# Patient Record
Sex: FEMALE | Race: WHITE | NOT HISPANIC OR LATINO | Employment: OTHER | URBAN - METROPOLITAN AREA
[De-identification: names, ages, dates, MRNs, and addresses within clinical notes are randomized per-mention and may not be internally consistent; named-entity substitution may affect disease eponyms.]

---

## 2017-02-13 ENCOUNTER — ALLSCRIPTS OFFICE VISIT (OUTPATIENT)
Dept: OTHER | Facility: OTHER | Age: 72
End: 2017-02-13

## 2017-05-22 ENCOUNTER — ALLSCRIPTS OFFICE VISIT (OUTPATIENT)
Dept: OTHER | Facility: OTHER | Age: 72
End: 2017-05-22

## 2017-06-26 ENCOUNTER — APPOINTMENT (OUTPATIENT)
Dept: LAB | Facility: HOSPITAL | Age: 72
End: 2017-06-26
Payer: MEDICARE

## 2017-06-26 ENCOUNTER — LAB REQUISITION (OUTPATIENT)
Dept: LAB | Facility: HOSPITAL | Age: 72
End: 2017-06-26
Payer: MEDICARE

## 2017-06-26 ENCOUNTER — ALLSCRIPTS OFFICE VISIT (OUTPATIENT)
Dept: OTHER | Facility: OTHER | Age: 72
End: 2017-06-26

## 2017-06-26 DIAGNOSIS — N95.0 POSTMENOPAUSAL BLEEDING: ICD-10-CM

## 2017-06-26 DIAGNOSIS — N94.9 UNSPECIFIED SYMPTOM ASSOCIATED WITH FEMALE GENITAL ORGANS: Primary | ICD-10-CM

## 2017-06-26 LAB
BACTERIA UR QL AUTO: ABNORMAL /HPF
BILIRUB UR QL STRIP: NEGATIVE
CLARITY UR: ABNORMAL
COLOR UR: YELLOW
GLUCOSE UR STRIP-MCNC: NEGATIVE MG/DL
HGB UR QL STRIP.AUTO: ABNORMAL
KETONES UR STRIP-MCNC: NEGATIVE MG/DL
LEUKOCYTE ESTERASE UR QL STRIP: ABNORMAL
NITRITE UR QL STRIP: NEGATIVE
NON-SQ EPI CELLS URNS QL MICRO: ABNORMAL /HPF
PH UR STRIP.AUTO: 6.5 [PH] (ref 4.5–8)
PROT UR STRIP-MCNC: NEGATIVE MG/DL
RBC #/AREA URNS AUTO: ABNORMAL /HPF
SP GR UR STRIP.AUTO: 1 (ref 1–1.03)
UROBILINOGEN UR QL STRIP.AUTO: 0.2 E.U./DL
WBC #/AREA URNS AUTO: ABNORMAL /HPF

## 2017-06-26 PROCEDURE — 88112 CYTOPATH CELL ENHANCE TECH: CPT

## 2017-06-26 PROCEDURE — 81001 URINALYSIS AUTO W/SCOPE: CPT

## 2017-06-26 PROCEDURE — 87086 URINE CULTURE/COLONY COUNT: CPT

## 2017-06-26 PROCEDURE — 87147 CULTURE TYPE IMMUNOLOGIC: CPT

## 2017-06-28 LAB — BACTERIA UR CULT: NORMAL

## 2017-06-29 ENCOUNTER — GENERIC CONVERSION - ENCOUNTER (OUTPATIENT)
Dept: OTHER | Facility: OTHER | Age: 72
End: 2017-06-29

## 2017-07-27 ENCOUNTER — ALLSCRIPTS OFFICE VISIT (OUTPATIENT)
Dept: OTHER | Facility: OTHER | Age: 72
End: 2017-07-27

## 2017-07-27 DIAGNOSIS — I35.0 NONRHEUMATIC AORTIC VALVE STENOSIS: ICD-10-CM

## 2018-01-12 VITALS — DIASTOLIC BLOOD PRESSURE: 80 MMHG | WEIGHT: 123 LBS | SYSTOLIC BLOOD PRESSURE: 128 MMHG | BODY MASS INDEX: 23.24 KG/M2

## 2018-01-13 VITALS — DIASTOLIC BLOOD PRESSURE: 82 MMHG | WEIGHT: 125 LBS | BODY MASS INDEX: 23.62 KG/M2 | SYSTOLIC BLOOD PRESSURE: 128 MMHG

## 2018-01-14 VITALS — SYSTOLIC BLOOD PRESSURE: 132 MMHG | BODY MASS INDEX: 24.02 KG/M2 | WEIGHT: 123 LBS | DIASTOLIC BLOOD PRESSURE: 80 MMHG

## 2018-01-14 NOTE — MISCELLANEOUS
Message   Recorded as Task   Date: 06/29/2017 07:16 AM, Created By: Myke Stoll   Task Name: Go to Result   Assigned To: Mark Anthony Yang   Regarding Patient: Lynne Cody, Status: In Progress   Comment:    Jennifer Norris - 29 Jun 2017 7:16 AM     TASK CREATED  urine shows some bacteria - not sure if they are implicated in her bleeding or not  Would treat with amoxil 250mg po TID x 10 days and await her pelvic ultrasound   Ariana Lemos - 29 Jun 2017 8:02 AM     TASK IN PROGRESS   Ariana Lemos - 29 Jun 2017 8:03 AM     TASK EDITED   Ariana Lemos - 29 Jun 2017 8:33 AM     TASK IN PROGRESS   Ariana Lemos - 29 Jun 2017 8:45 AM     TASK EDITED  Spoke with pt - she does not want the Amoxil and cancelled the pelvic u/s - asked why she cancelled it, pt stated she didn't want to go  She will treat it with probiotics and home remedies  Pt couldn't be swayed to go for the u/s  Will advise W87 of this  Active Problems    1  Anxiety disorder (300 00) (F41 9)   2  Aortic stenosis (424 1) (I35 0)   3  Cystocele (618 01) (N81 10)   4  Encounter for screening mammogram for breast cancer (V76 12) (Z12 31)   5  Hypertension (401 9) (I10)   6  Muscle ache (729 1) (M79 1)   7  Nausea (787 02) (R11 0)   8  Other specified cardiac dysrhythmias (427 89) (I49 8)   9  Postmenopausal bleeding (627 1) (N95 0)   10  Vaginal burning (625 8) (N94 9)    Current Meds   1  ALPRAZolam 0 25 MG Oral Tablet (Xanax); TAKE 1 TABLET 3 TIMES DAILY AS   NEEDED; Therapy: 41BEU9208 to (Renew:15Pkj3187); Last Rx:29Jun2016 Ordered   2  Premarin 0 625 MG/GM Vaginal Cream; sig 1 gm intravaginally 3 times week; Therapy: 24Apr2013 to (Evaluate:19Apr2014)  Requested for: 24Apr2013; Last   Rx:24Apr2013 Ordered   3  Sertraline HCl - 50 MG Oral Tablet (Zoloft); TAKE 1 TABLET DAILY AS DIRECTED; Therapy: 52KJN3085 to (Evaluate:24Jun2017)  Requested for: 00LTQ8482; Last   Rx:29Jun2016 Ordered   4   Verapamil HCl  MG Oral Capsule Extended Release 24 Hour; CP24 PO X 30; Therapy: 65OYU3269 to (Evaluate:24Jun2017)  Requested for: 23VRQ1305; Last   Rx:29Jun2016 Ordered    Allergies    1   No Known Drug Allergies    Plan  Vaginal burning    · Amoxicillin 250 MG Oral Capsule; TAKE 1 CAPSULE 3 TIMES DAILY    Signatures   Electronically signed by : Issac Brown, ; Jun 29 2017  8:45AM EST                       (Author)

## 2018-01-15 VITALS
HEIGHT: 60 IN | DIASTOLIC BLOOD PRESSURE: 86 MMHG | RESPIRATION RATE: 16 BRPM | WEIGHT: 123.25 LBS | HEART RATE: 59 BPM | OXYGEN SATURATION: 98 % | TEMPERATURE: 99.1 F | SYSTOLIC BLOOD PRESSURE: 134 MMHG | BODY MASS INDEX: 24.2 KG/M2

## 2018-07-11 DIAGNOSIS — F41.9 ANXIETY: ICD-10-CM

## 2018-07-11 DIAGNOSIS — I10 ESSENTIAL HYPERTENSION: Primary | ICD-10-CM

## 2018-07-11 RX ORDER — VERAPAMIL HYDROCHLORIDE 300 MG/1
CAPSULE, EXTENDED RELEASE ORAL
COMMUNITY
Start: 2016-06-29 | End: 2018-07-11 | Stop reason: SDUPTHER

## 2018-07-11 RX ORDER — ALPRAZOLAM 0.25 MG/1
1 TABLET ORAL 3 TIMES DAILY PRN
COMMUNITY
Start: 2016-06-29 | End: 2018-07-11 | Stop reason: SDUPTHER

## 2018-07-12 RX ORDER — ALPRAZOLAM 0.25 MG/1
0.25 TABLET ORAL 3 TIMES DAILY PRN
Qty: 30 TABLET | Refills: 0 | Status: SHIPPED | OUTPATIENT
Start: 2018-07-12 | End: 2019-01-21 | Stop reason: SDUPTHER

## 2018-07-12 RX ORDER — VERAPAMIL HYDROCHLORIDE 300 MG/1
300 CAPSULE, EXTENDED RELEASE ORAL DAILY
Qty: 90 CAPSULE | Refills: 1 | Status: SHIPPED | OUTPATIENT
Start: 2018-07-12 | End: 2019-01-21 | Stop reason: SDUPTHER

## 2018-11-01 ENCOUNTER — TELEPHONE (OUTPATIENT)
Dept: FAMILY MEDICINE CLINIC | Facility: CLINIC | Age: 73
End: 2018-11-01

## 2018-11-08 ENCOUNTER — TELEPHONE (OUTPATIENT)
Dept: FAMILY MEDICINE CLINIC | Facility: CLINIC | Age: 73
End: 2018-11-08

## 2018-11-08 NOTE — TELEPHONE ENCOUNTER
----- Message from Ty Muniz LPN sent at 19/7/8433  3:28 PM EST -----  Regarding: FW: Appointment  Patient is also needs an AWV      ----- Message -----  From: Ty Muniz LPN  Sent: 71/09/9906  11:08 AM  To: [facility]  Subject: Appointment                                      Please call pt and schedule follow up with Dr Jeffrey Bishop   Has not been seen since 7/2017

## 2019-01-03 ENCOUNTER — OFFICE VISIT (OUTPATIENT)
Dept: OBGYN CLINIC | Facility: CLINIC | Age: 74
End: 2019-01-03
Payer: MEDICARE

## 2019-01-03 VITALS — SYSTOLIC BLOOD PRESSURE: 120 MMHG | DIASTOLIC BLOOD PRESSURE: 70 MMHG | BODY MASS INDEX: 22.26 KG/M2 | WEIGHT: 114 LBS

## 2019-01-03 DIAGNOSIS — N81.11 CYSTOCELE, MIDLINE: Primary | ICD-10-CM

## 2019-01-03 PROCEDURE — 99213 OFFICE O/P EST LOW 20 MIN: CPT | Performed by: PHYSICIAN ASSISTANT

## 2019-01-03 NOTE — PROGRESS NOTES
Cheryl Teressa  1945    S:  68 y o  female here for pessary insertion  She was seen last in 6/2017, at which time had had some brownish and then pink vaginal bleeding  Her pessary was removed and no erosions were seen  She was sent for a pelvic ultrasound but never went because she never had bleeding again  She now presents today to reinsert her pessary  She is not using her Premarin cream but would like to restart  She denies vaginal bleeding, discharge, itching, burning or odor  No past medical history on file  Social History     Social History    Marital status: /Civil Union     Spouse name: N/A    Number of children: N/A    Years of education: N/A     Social History Main Topics    Smoking status: Not on file    Smokeless tobacco: Not on file    Alcohol use Not on file    Drug use: Unknown    Sexual activity: Not on file     Other Topics Concern    Not on file     Social History Narrative    No narrative on file     No family history on file  O:  /70 (BP Location: Right arm, Patient Position: Sitting, Cuff Size: Large)   Wt 51 7 kg (114 lb)   LMP  (LMP Unknown)   BMI 22 26 kg/m²     She is A&O x 3  Abdomen is soft and nontender  External genitals are normal without rash or lesion  Vagina is normal without discharge or bleeding, no erosions seen    Pessary was reinserted without difficulty  A/P:  Pessary Care  Pt will return in 3 months for routine pessary maintenance  She will call sooner with any problems

## 2019-01-16 ENCOUNTER — OFFICE VISIT (OUTPATIENT)
Dept: OBGYN CLINIC | Facility: CLINIC | Age: 74
End: 2019-01-16
Payer: MEDICARE

## 2019-01-16 VITALS — BODY MASS INDEX: 22.46 KG/M2 | DIASTOLIC BLOOD PRESSURE: 78 MMHG | WEIGHT: 115 LBS | SYSTOLIC BLOOD PRESSURE: 128 MMHG

## 2019-01-16 DIAGNOSIS — N81.11 CYSTOCELE, MIDLINE: Primary | ICD-10-CM

## 2019-01-16 PROCEDURE — 99213 OFFICE O/P EST LOW 20 MIN: CPT | Performed by: PHYSICIAN ASSISTANT

## 2019-01-16 NOTE — PROGRESS NOTES
Melita Bella  1945    S:  68 y o  female here for pessary insertion  She was seen on 1/3/19 when her pessary was inserted; it had been out for over a year prior to that  She says it did very well for 3-4 days but then fell out while she was on the commode  She was unable to reinsert the pessary herself  She denies vaginal bleeding, discharge, itching, burning or odor  History reviewed  No pertinent past medical history  Social History     Social History    Marital status: /Civil Union     Spouse name: N/A    Number of children: N/A    Years of education: N/A     Social History Main Topics    Smoking status: Never Smoker    Smokeless tobacco: Never Used    Alcohol use No    Drug use: No    Sexual activity: Not Currently     Other Topics Concern    None     Social History Narrative    None     History reviewed  No pertinent family history  O:  /78 (BP Location: Right arm, Patient Position: Sitting, Cuff Size: Standard)   Wt 52 2 kg (115 lb)   LMP  (LMP Unknown)   BMI 22 46 kg/m²     She is A&O x 3  Abdomen is soft and nontender  External genitals are normal without rash or lesion  Vagina is normal without discharge or bleeding    Pessary was inserted without difficulty  It appears to be well fitted  A/P:  Pessary Care  Pt will return in 3 months for routine pessary maintenance  She will call sooner with any problems

## 2019-01-21 ENCOUNTER — OFFICE VISIT (OUTPATIENT)
Dept: FAMILY MEDICINE CLINIC | Facility: CLINIC | Age: 74
End: 2019-01-21
Payer: MEDICARE

## 2019-01-21 VITALS
HEIGHT: 59 IN | TEMPERATURE: 98.2 F | BODY MASS INDEX: 23.39 KG/M2 | DIASTOLIC BLOOD PRESSURE: 80 MMHG | WEIGHT: 116 LBS | OXYGEN SATURATION: 98 % | SYSTOLIC BLOOD PRESSURE: 110 MMHG | HEART RATE: 88 BPM | RESPIRATION RATE: 16 BRPM

## 2019-01-21 DIAGNOSIS — F41.9 ANXIETY: ICD-10-CM

## 2019-01-21 DIAGNOSIS — I10 ESSENTIAL HYPERTENSION: ICD-10-CM

## 2019-01-21 DIAGNOSIS — I35.0 AORTIC VALVE STENOSIS, ETIOLOGY OF CARDIAC VALVE DISEASE UNSPECIFIED: ICD-10-CM

## 2019-01-21 DIAGNOSIS — R53.83 FATIGUE, UNSPECIFIED TYPE: ICD-10-CM

## 2019-01-21 DIAGNOSIS — Z00.00 MEDICARE ANNUAL WELLNESS VISIT, SUBSEQUENT: Primary | ICD-10-CM

## 2019-01-21 DIAGNOSIS — E78.5 DYSLIPIDEMIA: ICD-10-CM

## 2019-01-21 PROCEDURE — 99214 OFFICE O/P EST MOD 30 MIN: CPT | Performed by: INTERNAL MEDICINE

## 2019-01-21 PROCEDURE — G0439 PPPS, SUBSEQ VISIT: HCPCS | Performed by: INTERNAL MEDICINE

## 2019-01-21 RX ORDER — ALPRAZOLAM 0.25 MG/1
0.25 TABLET ORAL 3 TIMES DAILY PRN
Qty: 90 TABLET | Refills: 0 | Status: SHIPPED | OUTPATIENT
Start: 2019-01-21 | End: 2019-07-09 | Stop reason: SDUPTHER

## 2019-01-21 RX ORDER — VERAPAMIL HYDROCHLORIDE 300 MG/1
300 CAPSULE, EXTENDED RELEASE ORAL DAILY
Qty: 90 CAPSULE | Refills: 1 | Status: SHIPPED | OUTPATIENT
Start: 2019-01-21 | End: 2019-07-09 | Stop reason: SDUPTHER

## 2019-01-21 NOTE — PROGRESS NOTES
Assessment and Plan:    Problem List Items Addressed This Visit     None        Health Maintenance Due   Topic Date Due    Hepatitis C Screening  1945    Depression Screening PHQ  1945    Medicare Annual Wellness Visit (AWV)  1945    MAMMOGRAM  1945    CRC Screening: Colonoscopy  1945    DTaP,Tdap,and Td Vaccines (1 - Tdap) 05/02/1966    Fall Risk  05/02/2010    Urinary Incontinence Screening  05/02/2010    Pneumococcal PPSV23/PCV13 65+ Years / Low and Medium Risk (1 of 2 - PCV13) 05/02/2010    INFLUENZA VACCINE  07/01/2018         HPI:  Bard Barclay is a 68 y o  female here for her Subsequent Wellness Visit  Patient Active Problem List   Diagnosis    Cystocele, midline     No past medical history on file  No past surgical history on file  No family history on file  History   Smoking Status    Never Smoker   Smokeless Tobacco    Never Used     History   Alcohol Use No      History   Drug Use No       Current Outpatient Prescriptions   Medication Sig Dispense Refill    ALPRAZolam (XANAX) 0 25 mg tablet Take 1 tablet (0 25 mg total) by mouth 3 (three) times a day as needed for anxiety 30 tablet 0    conjugated estrogens (PREMARIN) vaginal cream Insert 1 g into the vagina 2 (two) times a week 30 g 3    verapamil (VERELAN) 300 MG CP24 Take 1 capsule (300 mg total) by mouth daily 90 capsule 1    sertraline (ZOLOFT) 50 mg tablet Take 1 tablet (50 mg total) by mouth daily (Patient not taking: Reported on 1/21/2019 ) 90 tablet 1     No current facility-administered medications for this visit  No Known Allergies  There is no immunization history for the selected administration types on file for this patient  Patient Care Team:  Alana Lee DO as PCP - General    Medicare Screening Tests and Risk Assessments:      Health Risk Assessment:  Patient rates overall health as good  Patient feels that their physical health rating is Same  Eyesight was rated as Same  Hearing was rated as Same  Patient feels that their emotional and mental health rating is Same  Pain experienced by patient in the last 7 days has been None  Patient states that she has experienced no weight loss or gain in last 6 months  Emotional/Mental Health:  Patient has been feeling nervous/anxious  PHQ-9 Depression Screening:    Frequency of the following problems over the past two weeks:      1  Little interest or pleasure in doing things: 0 - not at all      2  Feeling down, depressed, or hopeless: 0 - not at all  PHQ-2 Score: 0          Broken Bones/Falls: Fall Risk Assessment:    In the past year, patient has experienced: No history of falling in past year          Bladder/Bowel:  Patient has leaked urine accidently in the last six months  Patient reports no loss of bowel control  Immunizations:  Patient has not had a flu vaccination within the last year  Patient has not received a pneumonia shot  Patient has not received a shingles shot  (Additional Comments: Declines flu or pneumonia shot)    Home Safety:  Patient does not have trouble with stairs inside or outside of their home  Patient currently reports that there are no safety hazards present in home, working smoke alarms,     Preventative Screenings:   No breast cancer screening performed, no colon cancer screen completed, cholesterol screen completed, no glaucoma eye exam completed    Nutrition:  Current diet: Regular with servings of the following:    Medications:  Patient is currently taking over-the-counter supplements  Patient is able to manage medications  Lifestyle Choices:  Patient reports no tobacco use  Patient has not smoked or used tobacco in the past   Patient reports no alcohol use  Patient does not drive a vehicle  Patient wears seat belt          Activities of Daily Living:  Can get out of bed by his or her self, able to dress self, able to make own meals, able to do own shopping, able to bathe self, can do own laundry/housekeeping, can manage own money, pay bills and track expenses    Previous Hospitalizations:  No hospitalization or ED visit in past 12 months        Advanced Directives:  Patient has decided on a power of   Patient has spoken to designated power of   Patient has not completed advanced directive  Preventative Screening/Counseling:      Cardiovascular:      General: Risks and Benefits Discussed and Screening Current          Diabetes:      General: Risks and Benefits Discussed          Colorectal Cancer:      General: Risks and Benefits Discussed and Patient Declines          Breast Cancer:      General: Risks and Benefits Discussed and Patient Declines          Cervical Cancer:      General: Risks and Benefits Discussed and Screening Current          Osteoporosis:      General: Risks and Benefits Discussed and Patient Declines          AAA:      General: Risks and Benefits Discussed and Screening Current          Glaucoma:      General: Risks and Benefits Discussed and Screening Current          HIV:      General: Risks and Benefits Discussed and Patient Declines          Hepatitis C:      General: Risks and Benefits Discussed and Patient Declines        Advanced Directives:   Patient has living will for healthcare, has durable POA for healthcare, patient does not have an advanced directive       Immunizations:      Influenza: Risks & Benefits Discussed and Patient Declines      Pneumococcal: Risks & Benefits Discussed and Patient Declines

## 2019-01-21 NOTE — PROGRESS NOTES
Assessment/Plan:         Diagnoses and all orders for this visit:    Aortic valve stenosis, etiology of cardiac valve disease unspecified  -     Echo complete with contrast if indicated; Future    Anxiety  Comments:  on sertraline and xanax  Orders:  -     ALPRAZolam (XANAX) 0 25 mg tablet; Take 1 tablet (0 25 mg total) by mouth 3 (three) times a day as needed for anxiety  -     sertraline (ZOLOFT) 50 mg tablet; Take 1 tablet (50 mg total) by mouth daily    Essential hypertension  -     verapamil (VERELAN) 300 MG CP24; Take 1 capsule (300 mg total) by mouth daily  -     Comprehensive metabolic panel; Future  -     TSH, 3rd generation with Free T4 reflex; Future    Dyslipidemia  -     Comprehensive metabolic panel; Future  -     Lipid panel; Future    Fatigue, unspecified type  -     CBC; Future    Other orders  -     Cancel: Mammo screening bilateral w cad; Future  -     Cancel: Ambulatory referral to Colorectal Surgery; Future          Subjective:      Patient ID: Cuate Mayo is a 68 y o  female  Discussed aortic stenosis  She refuses echo  She has not seen card since in hosp 2015  Denies cp/syncope/chf hx  The following portions of the patient's history were reviewed and updated as appropriate:   She  has no past medical history on file  She   Patient Active Problem List    Diagnosis Date Noted    Cystocele, midline 01/03/2019    Aortic stenosis 07/27/2017    Hypertension 11/25/2013    Anxiety disorder 11/21/2012     She  has a past surgical history that includes Cholecystectomy  Her family history is not on file  She  reports that she has never smoked  She has never used smokeless tobacco  She reports that she does not drink alcohol or use drugs    Current Outpatient Prescriptions   Medication Sig Dispense Refill    ALPRAZolam (XANAX) 0 25 mg tablet Take 1 tablet (0 25 mg total) by mouth 3 (three) times a day as needed for anxiety 90 tablet 0    conjugated estrogens (PREMARIN) vaginal cream Insert 1 g into the vagina 2 (two) times a week 30 g 3    verapamil (VERELAN) 300 MG CP24 Take 1 capsule (300 mg total) by mouth daily 90 capsule 1    sertraline (ZOLOFT) 50 mg tablet Take 1 tablet (50 mg total) by mouth daily 90 tablet 1     No current facility-administered medications for this visit  Current Outpatient Prescriptions on File Prior to Visit   Medication Sig    conjugated estrogens (PREMARIN) vaginal cream Insert 1 g into the vagina 2 (two) times a week     No current facility-administered medications on file prior to visit  She has No Known Allergies       Review of Systems   Constitutional: Negative  HENT: Negative  Respiratory: Negative  Cardiovascular: Negative  Neurological: Negative for syncope  Objective:      /80 (BP Location: Right arm, Patient Position: Sitting, Cuff Size: Standard)   Pulse 88   Temp 98 2 °F (36 8 °C) (Tympanic)   Resp 16   Ht 4' 10 75" (1 492 m)   Wt 52 6 kg (116 lb)   LMP  (LMP Unknown)   SpO2 98%   BMI 23 63 kg/m²          Physical Exam   Constitutional: She appears well-developed and well-nourished  No distress  HENT:   Head: Normocephalic and atraumatic  Right Ear: External ear normal    Left Ear: External ear normal    Nose: Nose normal    Mouth/Throat: Oropharynx is clear and moist  No oropharyngeal exudate  Neck: Normal range of motion  Neck supple  No thyromegaly present  Cardiovascular: Normal rate and regular rhythm  Exam reveals no gallop and no friction rub  Murmur heard  Pulmonary/Chest: Effort normal and breath sounds normal  No respiratory distress  She has no wheezes  She has no rales  Musculoskeletal: She exhibits no edema  Lymphadenopathy:     She has cervical adenopathy  Skin: She is not diaphoretic

## 2019-07-09 DIAGNOSIS — F41.9 ANXIETY: ICD-10-CM

## 2019-07-09 DIAGNOSIS — I10 ESSENTIAL HYPERTENSION: ICD-10-CM

## 2019-07-10 RX ORDER — ALPRAZOLAM 0.25 MG/1
0.25 TABLET ORAL 3 TIMES DAILY PRN
Qty: 90 TABLET | Refills: 0 | Status: SHIPPED | OUTPATIENT
Start: 2019-07-10 | End: 2020-01-16 | Stop reason: SDUPTHER

## 2019-07-10 RX ORDER — VERAPAMIL HYDROCHLORIDE 300 MG/1
300 CAPSULE, EXTENDED RELEASE ORAL DAILY
Qty: 90 CAPSULE | Refills: 1 | Status: SHIPPED | OUTPATIENT
Start: 2019-07-10 | End: 2020-01-16 | Stop reason: SDUPTHER

## 2020-01-16 DIAGNOSIS — F41.9 ANXIETY: ICD-10-CM

## 2020-01-16 DIAGNOSIS — I10 ESSENTIAL HYPERTENSION: ICD-10-CM

## 2020-01-17 RX ORDER — ALPRAZOLAM 0.25 MG/1
0.25 TABLET ORAL 3 TIMES DAILY PRN
Qty: 90 TABLET | Refills: 0 | Status: SHIPPED | OUTPATIENT
Start: 2020-01-17 | End: 2020-07-01 | Stop reason: SDUPTHER

## 2020-01-17 RX ORDER — VERAPAMIL HYDROCHLORIDE 300 MG/1
300 CAPSULE, EXTENDED RELEASE ORAL DAILY
Qty: 90 CAPSULE | Refills: 1 | Status: SHIPPED | OUTPATIENT
Start: 2020-01-17 | End: 2020-07-01 | Stop reason: SDUPTHER

## 2020-03-11 ENCOUNTER — TELEPHONE (OUTPATIENT)
Dept: FAMILY MEDICINE CLINIC | Facility: CLINIC | Age: 75
End: 2020-03-11

## 2020-05-27 ENCOUNTER — TELEPHONE (OUTPATIENT)
Dept: FAMILY MEDICINE CLINIC | Facility: CLINIC | Age: 75
End: 2020-05-27

## 2020-07-01 ENCOUNTER — TELEPHONE (OUTPATIENT)
Dept: FAMILY MEDICINE CLINIC | Facility: CLINIC | Age: 75
End: 2020-07-01

## 2020-07-01 ENCOUNTER — TELEMEDICINE (OUTPATIENT)
Dept: FAMILY MEDICINE CLINIC | Facility: CLINIC | Age: 75
End: 2020-07-01
Payer: MEDICARE

## 2020-07-01 VITALS
HEIGHT: 59 IN | BODY MASS INDEX: 22.38 KG/M2 | WEIGHT: 111 LBS | DIASTOLIC BLOOD PRESSURE: 68 MMHG | HEART RATE: 62 BPM | SYSTOLIC BLOOD PRESSURE: 113 MMHG

## 2020-07-01 DIAGNOSIS — I10 ESSENTIAL HYPERTENSION: Primary | ICD-10-CM

## 2020-07-01 DIAGNOSIS — I35.0 AORTIC VALVE STENOSIS, ETIOLOGY OF CARDIAC VALVE DISEASE UNSPECIFIED: ICD-10-CM

## 2020-07-01 DIAGNOSIS — F41.9 ANXIETY: ICD-10-CM

## 2020-07-01 PROCEDURE — 99441 PR PHYS/QHP TELEPHONE EVALUATION 5-10 MIN: CPT | Performed by: INTERNAL MEDICINE

## 2020-07-01 RX ORDER — ALPRAZOLAM 0.25 MG/1
0.25 TABLET ORAL 3 TIMES DAILY PRN
Qty: 90 TABLET | Refills: 0 | Status: SHIPPED | OUTPATIENT
Start: 2020-07-01 | End: 2020-12-21 | Stop reason: SDUPTHER

## 2020-07-01 RX ORDER — VERAPAMIL HYDROCHLORIDE 300 MG/1
300 CAPSULE, EXTENDED RELEASE ORAL DAILY
Qty: 90 CAPSULE | Refills: 1 | Status: SHIPPED | OUTPATIENT
Start: 2020-07-01 | End: 2020-12-21 | Stop reason: SDUPTHER

## 2020-07-01 NOTE — TELEPHONE ENCOUNTER
Called & spoke to pt to schedule 6 mo f/u appt for Antonio   Pt stated she'd call in Dec to schedule

## 2020-07-01 NOTE — PROGRESS NOTES
Virtual Brief Visit    Assessment/Plan:    Problem List Items Addressed This Visit        Cardiovascular and Mediastinum    Aortic stenosis    Relevant Medications    verapamil (VERELAN) 300 MG CP24    Hypertension - Primary    Relevant Medications    verapamil (VERELAN) 300 MG CP24      Other Visit Diagnoses     Anxiety        on sertraline and xanax    Relevant Medications    ALPRAZolam (XANAX) 0 25 mg tablet    sertraline (ZOLOFT) 50 mg tablet        It was my intent to perform this visit via video technology but the patient was not able to do a video connection so the visit was completed via audio telephone only  Video          Reason for visit is see hpi     Encounter provider Ramin Serrano DO    Provider located at [address] [place] [id number] [phone]    Recent Visits  Date Type Provider Dept   07/01/20 Telephone DO [facility]   07/01/20 Telemedicine DO [facility]   Showing recent visits within past 7 days and meeting all other requirements     Future Appointments  No visits were found meeting these conditions  Showing future appointments within next 150 days and meeting all other requirements        After connecting through telephone, the patient was identified by name and date of birth  Daylin Lobo was informed that this is a telemedicine visit and that the visit is being conducted through telephone  My office door was closed  No one else was in the room  She acknowledged consent and understanding of privacy and security of the platform  The patient has agreed to participate and understands she can discontinue the visit at any time  Patient is aware this is a billable service  Subjective    Daylin Lobo is a 76 y o  female see hpi  Pt due for refills  She states too afraid to come in  She has diag of aortic sten  Denies syncope/cp/sob  Again refuses echo    She also does not follow with card   Discussed she needs to  She also declines mammo/colo/lab  History reviewed  No pertinent past medical history  Past Surgical History:   Procedure Laterality Date    CHOLECYSTECTOMY         Current Outpatient Medications   Medication Sig Dispense Refill    ALPRAZolam (XANAX) 0 25 mg tablet Take 1 tablet (0 25 mg total) by mouth 3 (three) times a day as needed for anxiety 90 tablet 0    conjugated estrogens (PREMARIN) vaginal cream Insert 1 g into the vagina 2 (two) times a week 30 g 3    sertraline (ZOLOFT) 50 mg tablet Take 1 tablet (50 mg total) by mouth daily 90 tablet 1    verapamil (VERELAN) 300 MG CP24 Take 1 capsule (300 mg total) by mouth daily 90 capsule 1     No current facility-administered medications for this visit  No Known Allergies    Review of Systems   Constitutional: Negative for chills and fever  HENT: Negative  Respiratory: Negative for shortness of breath  Cardiovascular: Negative for chest pain  Neurological: Negative for syncope  Psychiatric/Behavioral: The patient is nervous/anxious  Vitals:    07/01/20 1145   BP: 113/68   Pulse: 62   Weight: 50 3 kg (111 lb)   Height: 4' 11" (1 499 m)       As a result of this visit, I have not referred the patient for further respiratory evaluation  I spent 8 minutes directly with the patient during this visit    VIRTUAL VISIT DISCLAIMER    Sam De La Cruz acknowledges that she has consented to an online visit or consultation  She understands that the online visit is based solely on information provided by her, and that, in the absence of a face-to-face physical evaluation by the physician, the diagnosis she receives is both limited and provisional in terms of accuracy and completeness  This is not intended to replace a full medical face-to-face evaluation by the physician  Sam De La Cruz understands and accepts these terms

## 2020-12-08 ENCOUNTER — TELEPHONE (OUTPATIENT)
Dept: FAMILY MEDICINE CLINIC | Facility: CLINIC | Age: 75
End: 2020-12-08

## 2020-12-21 DIAGNOSIS — F41.9 ANXIETY: ICD-10-CM

## 2020-12-21 DIAGNOSIS — I10 ESSENTIAL HYPERTENSION: ICD-10-CM

## 2020-12-21 RX ORDER — VERAPAMIL HYDROCHLORIDE 300 MG/1
300 CAPSULE, EXTENDED RELEASE ORAL DAILY
Qty: 30 CAPSULE | Refills: 0 | Status: SHIPPED | OUTPATIENT
Start: 2020-12-21 | End: 2021-01-11 | Stop reason: SDUPTHER

## 2020-12-21 RX ORDER — ALPRAZOLAM 0.25 MG/1
0.25 TABLET ORAL 3 TIMES DAILY PRN
Qty: 45 TABLET | Refills: 0 | Status: SHIPPED | OUTPATIENT
Start: 2020-12-21 | End: 2021-03-05 | Stop reason: SDUPTHER

## 2021-01-11 DIAGNOSIS — F41.9 ANXIETY: ICD-10-CM

## 2021-01-11 DIAGNOSIS — I10 ESSENTIAL HYPERTENSION: ICD-10-CM

## 2021-01-12 RX ORDER — VERAPAMIL HYDROCHLORIDE 300 MG/1
300 CAPSULE, EXTENDED RELEASE ORAL DAILY
Qty: 30 CAPSULE | Refills: 0 | Status: SHIPPED | OUTPATIENT
Start: 2021-01-12 | End: 2021-03-05 | Stop reason: SDUPTHER

## 2021-01-12 RX ORDER — ALPRAZOLAM 0.25 MG/1
0.25 TABLET ORAL 3 TIMES DAILY PRN
Qty: 45 TABLET | Refills: 0 | OUTPATIENT
Start: 2021-01-12

## 2021-03-05 DIAGNOSIS — I10 ESSENTIAL HYPERTENSION: ICD-10-CM

## 2021-03-05 DIAGNOSIS — F41.9 ANXIETY: ICD-10-CM

## 2021-03-05 NOTE — TELEPHONE ENCOUNTER
Pt refuses to come in to office due to [address]  I did make pts  aware that we are not seeing anyone with any sort of sick symptoms in our office  I offered pt a VV but they only have to capability of a telephone appt  Pt past due for AWV & was last seen in July of 2020  Requesting 90 day supply of meds w/ refills

## 2021-03-09 RX ORDER — ALPRAZOLAM 0.25 MG/1
0.25 TABLET ORAL 3 TIMES DAILY PRN
Qty: 45 TABLET | Refills: 0 | Status: SHIPPED | OUTPATIENT
Start: 2021-03-09 | End: 2021-04-16 | Stop reason: SDUPTHER

## 2021-03-09 RX ORDER — VERAPAMIL HYDROCHLORIDE 300 MG/1
300 CAPSULE, EXTENDED RELEASE ORAL DAILY
Qty: 30 CAPSULE | Refills: 0 | Status: SHIPPED | OUTPATIENT
Start: 2021-03-09 | End: 2021-04-16 | Stop reason: SDUPTHER

## 2021-04-16 DIAGNOSIS — I10 ESSENTIAL HYPERTENSION: ICD-10-CM

## 2021-04-16 DIAGNOSIS — F41.9 ANXIETY: ICD-10-CM

## 2021-04-19 RX ORDER — ALPRAZOLAM 0.25 MG/1
0.25 TABLET ORAL 3 TIMES DAILY PRN
Qty: 45 TABLET | Refills: 0 | Status: SHIPPED | OUTPATIENT
Start: 2021-04-19 | End: 2021-05-05 | Stop reason: SDUPTHER

## 2021-04-19 RX ORDER — VERAPAMIL HYDROCHLORIDE 300 MG/1
300 CAPSULE, EXTENDED RELEASE ORAL DAILY
Qty: 30 CAPSULE | Refills: 0 | Status: SHIPPED | OUTPATIENT
Start: 2021-04-19 | End: 2021-05-05 | Stop reason: SDUPTHER

## 2021-05-05 ENCOUNTER — OFFICE VISIT (OUTPATIENT)
Dept: FAMILY MEDICINE CLINIC | Facility: CLINIC | Age: 76
End: 2021-05-05
Payer: MEDICARE

## 2021-05-05 VITALS
OXYGEN SATURATION: 98 % | DIASTOLIC BLOOD PRESSURE: 78 MMHG | HEART RATE: 88 BPM | TEMPERATURE: 97.7 F | BODY MASS INDEX: 24.56 KG/M2 | RESPIRATION RATE: 16 BRPM | WEIGHT: 117 LBS | SYSTOLIC BLOOD PRESSURE: 124 MMHG | HEIGHT: 58 IN

## 2021-05-05 DIAGNOSIS — L21.9 SEBORRHEA: ICD-10-CM

## 2021-05-05 DIAGNOSIS — I35.0 AORTIC VALVE STENOSIS, ETIOLOGY OF CARDIAC VALVE DISEASE UNSPECIFIED: ICD-10-CM

## 2021-05-05 DIAGNOSIS — Z00.00 MEDICARE ANNUAL WELLNESS VISIT, SUBSEQUENT: Primary | ICD-10-CM

## 2021-05-05 DIAGNOSIS — F33.9 DEPRESSION, RECURRENT (HCC): ICD-10-CM

## 2021-05-05 DIAGNOSIS — F41.9 ANXIETY: ICD-10-CM

## 2021-05-05 DIAGNOSIS — I10 ESSENTIAL HYPERTENSION: ICD-10-CM

## 2021-05-05 PROCEDURE — 1123F ACP DISCUSS/DSCN MKR DOCD: CPT | Performed by: INTERNAL MEDICINE

## 2021-05-05 PROCEDURE — 99214 OFFICE O/P EST MOD 30 MIN: CPT | Performed by: INTERNAL MEDICINE

## 2021-05-05 PROCEDURE — G0439 PPPS, SUBSEQ VISIT: HCPCS | Performed by: INTERNAL MEDICINE

## 2021-05-05 RX ORDER — VERAPAMIL HYDROCHLORIDE 300 MG/1
300 CAPSULE, EXTENDED RELEASE ORAL DAILY
Qty: 90 CAPSULE | Refills: 1 | Status: SHIPPED | OUTPATIENT
Start: 2021-05-05 | End: 2021-11-26 | Stop reason: SDUPTHER

## 2021-05-05 RX ORDER — KETOCONAZOLE 20 MG/ML
1 SHAMPOO TOPICAL 2 TIMES WEEKLY
Qty: 120 ML | Refills: 1 | Status: SHIPPED | OUTPATIENT
Start: 2021-05-06

## 2021-05-05 RX ORDER — ALPRAZOLAM 0.25 MG/1
0.25 TABLET ORAL 3 TIMES DAILY PRN
Qty: 45 TABLET | Refills: 0 | Status: SHIPPED | OUTPATIENT
Start: 2021-05-05 | End: 2021-11-26 | Stop reason: SDUPTHER

## 2021-05-05 NOTE — PROGRESS NOTES
Depression Screening and Follow-up Plan: Patient's depression screening was positive with a PHQ-2 score of 6  Their PHQ-9 score was 9  Patient assessed for underlying major depression  Brief counseling provided and recommend additional follow-up/re-evaluation next office visit  Assessment/Plan:         Diagnoses and all orders for this visit:    Depression, recurrent ([facility] )  Comments:  restart zoloft    Anxiety  Comments:  on sertraline and xanax  Orders:  -     ALPRAZolam (XANAX) 0 25 mg tablet; Take 1 tablet (0 25 mg total) by mouth 3 (three) times a day as needed for anxiety Must have office appt before refill again  -     sertraline (ZOLOFT) 50 mg tablet; Take 1 tablet (50 mg total) by mouth daily    Essential hypertension  Comments:  stable  Orders:  -     verapamil (VERELAN) 300 MG CP24; Take 1 capsule (300 mg total) by mouth daily    Aortic valve stenosis, etiology of cardiac valve disease unspecified  Comments:  again refuses card consult    Seborrhea  Comments:  nizoral shampoo  Orders:  -     ketoconazole (NIZORAL) 2 % shampoo; Apply 1 application topically 2 (two) times a week For one month then use weekly  Allow lather to remain on scalp x 4 min          Subjective:      Patient ID: Aliza Menon is a 68 y o  female  Pt needs rx refills  +upset with her /brothers health  Agrees to restart zoloft - suicide  Does not see card for murmur and denies again  +itchy post scalp  Agrees for shampoo      The following portions of the patient's history were reviewed and updated as appropriate: She  has no past medical history on file  She   Patient Active Problem List    Diagnosis Date Noted    Depression, recurrent ([place] [address] ) 05/05/2021    Cystocele, midline 01/03/2019    Aortic stenosis 07/27/2017    Hypertension 11/25/2013    Anxiety disorder 11/21/2012     She  has a past surgical history that includes Cholecystectomy  Her family history is not on file    She  reports that she has never smoked  She has never used smokeless tobacco  She reports that she does not drink alcohol or use drugs  Current Outpatient Medications   Medication Sig Dispense Refill    ALPRAZolam (XANAX) 0 25 mg tablet Take 1 tablet (0 25 mg total) by mouth 3 (three) times a day as needed for anxiety Must have office appt before refill again 45 tablet 0    verapamil (VERELAN) 300 MG CP24 Take 1 capsule (300 mg total) by mouth daily 90 capsule 1    conjugated estrogens (PREMARIN) vaginal cream Insert 1 g into the vagina 2 (two) times a week (Patient not taking: Reported on 5/5/2021) 30 g 3    ketoconazole (NIZORAL) 2 % shampoo Apply 1 application topically 2 (two) times a week For one month then use weekly  Allow lather to remain on scalp x 4 min 120 mL 1    sertraline (ZOLOFT) 50 mg tablet Take 1 tablet (50 mg total) by mouth daily 90 tablet 1     No current facility-administered medications for this visit  Current Outpatient Medications on File Prior to Visit   Medication Sig    conjugated estrogens (PREMARIN) vaginal cream Insert 1 g into the vagina 2 (two) times a week (Patient not taking: Reported on 5/5/2021)     No current facility-administered medications on file prior to visit  She has No Known Allergies       Review of Systems   Constitutional: Negative  HENT: Negative  Respiratory: Negative  Cardiovascular: Negative  Psychiatric/Behavioral: Positive for dysphoric mood  Negative for suicidal ideas  The patient is nervous/anxious  Objective:      /78 (BP Location: Left arm, Patient Position: Sitting, Cuff Size: Standard)   Pulse 88   Temp 97 7 °F (36 5 °C) (Temporal)   Resp 16   Ht 4' 10" (1 473 m)   Wt 53 1 kg (117 lb)   LMP  (LMP Unknown)   SpO2 98%   BMI 24 45 kg/m²          Physical Exam  Constitutional:       Appearance: Normal appearance  HENT:      Head: Normocephalic and atraumatic        Right Ear: Tympanic membrane, ear canal and external ear normal       Left Ear: Tympanic membrane, ear canal and external ear normal    Neck:      Musculoskeletal: Neck supple  Cardiovascular:      Rate and Rhythm: Normal rate and regular rhythm  Pulmonary:      Effort: Pulmonary effort is normal       Breath sounds: Normal breath sounds  Skin:     Comments: Seborrhea occipital area   Neurological:      Mental Status: She is alert

## 2021-05-05 NOTE — PROGRESS NOTES
Assessment and Plan:     Problem List Items Addressed This Visit     None          Depression Screening and Follow-up Plan: Patient's depression screening was positive with a PHQ-2 score of 6  Their PHQ-9 score was 9  Patient assessed for underlying major depression  Brief counseling provided and recommend additional follow-up/re-evaluation next office visit  Restart zoloft      Preventive health issues were discussed with patient, and age appropriate screening tests were ordered as noted in patient's After Visit Summary  Personalized health advice and appropriate referrals for health education or preventive services given if needed, as noted in patient's After Visit Summary  History of Present Illness:     Patient presents for Medicare Annual Wellness visit    Patient Care Team:  Carley Scherer DO as PCP - General     Problem List:     Patient Active Problem List   Diagnosis    Cystocele, midline    Anxiety disorder    Aortic stenosis    Hypertension      Past Medical and Surgical History:     No past medical history on file  Past Surgical History:   Procedure Laterality Date    CHOLECYSTECTOMY        Family History:     No family history on file     Social History:        Social History     Socioeconomic History    Marital status: /Civil Union     Spouse name: Not on file    Number of children: Not on file    Years of education: Not on file    Highest education level: Not on file   Occupational History    Not on file   Social Needs    Financial resource strain: Not on file    Food insecurity     Worry: Not on file     Inability: Not on file   [employer] needs     Medical: Not on file     Non-medical: Not on file   Tobacco Use    Smoking status: Never Smoker    Smokeless tobacco: Never Used   Substance and Sexual Activity    Alcohol use: No    Drug use: No    Sexual activity: Not Currently   Lifestyle    Physical activity     Days per week: Not on file     Minutes per session: Not on file    Stress: Not on file   Relationships    Social connections     Talks on phone: Not on file     Gets together: Not on file     Attends Spiritism service: Not on file     Active member of club or organization: Not on file     Attends meetings of clubs or organizations: Not on file     Relationship status: Not on file    Intimate partner violence     Fear of current or ex partner: Not on file     Emotionally abused: Not on file     Physically abused: Not on file     Forced sexual activity: Not on file   Other Topics Concern    Not on file   Social History Narrative    Not on file      Medications and Allergies:     Current Outpatient Medications   Medication Sig Dispense Refill    ALPRAZolam (XANAX) 0 25 mg tablet Take 1 tablet (0 25 mg total) by mouth 3 (three) times a day as needed for anxiety Must have office appt before refill again 45 tablet 0    verapamil (VERELAN) 300 MG CP24 Take 1 capsule (300 mg total) by mouth daily 30 capsule 0    conjugated estrogens (PREMARIN) vaginal cream Insert 1 g into the vagina 2 (two) times a week (Patient not taking: Reported on 5/5/2021) 30 g 3    sertraline (ZOLOFT) 50 mg tablet Take 1 tablet (50 mg total) by mouth daily (Patient not taking: Reported on 5/5/2021) 90 tablet 1     No current facility-administered medications for this visit  No Known Allergies   Immunizations: There is no immunization history for the selected administration types on file for this patient     Health Maintenance:         Topic Date Due    Hepatitis C Screening  Never done         Topic Date Due    COVID-19 Vaccine (1) Never done    DTaP,Tdap,and Td Vaccines (1 - Tdap) Never done    Pneumococcal Vaccine: 65+ Years (1 of 1 - PPSV23) Never done      Medicare Health Risk Assessment:     /78 (BP Location: Left arm, Patient Position: Sitting, Cuff Size: Standard)   Pulse 88   Temp 97 7 °F (36 5 °C) (Temporal)   Resp 16   Ht 4' 10" (1 473 m)   Wt 53 1 kg (117 lb)   LMP  (LMP Unknown)   SpO2 98%   BMI 24 45 kg/m²      Shon Paez is here for her Subsequent Wellness visit  Health Risk Assessment:   Patient rates overall health as good  Patient feels that their physical health rating is same  Patient is very satisfied with their life  Eyesight was rated as same  Hearing was rated as slightly worse  Patient feels that their emotional and mental health rating is slightly worse  Patients states they are never, rarely angry  Patient states they are sometimes unusually tired/fatigued  Pain experienced in the last 7 days has been none  Patient states that she has experienced no weight loss or gain in last 6 months  Declines audiology    Depression Screening:   PHQ-2 Score: 6  PHQ-9 Score: 9      Fall Risk Screening: In the past year, patient has experienced: no history of falling in past year      Urinary Incontinence Screening:   Patient has leaked urine accidently in the last six months  Home Safety:  Patient does not have trouble with stairs inside or outside of their home  Patient has working smoke alarms     Nutrition:   Current diet is Regular  Medications:   Patient is currently taking over-the-counter supplements  OTC medications include: see medication list  Patient is able to manage medications  Activities of Daily Living (ADLs)/Instrumental Activities of Daily Living (IADLs):   Walk and transfer into and out of bed and chair?: Yes  Dress and groom yourself?: Yes    Bathe or shower yourself?: Yes    Feed yourself? Yes  Do your laundry/housekeeping?: Yes  Manage your money, pay your bills and track your expenses?: No  Make your own meals?: Yes    Do your own shopping?: Yes    Previous Hospitalizations:   Any hospitalizations or ED visits within the last 12 months?: No      Advance Care Planning:   Living will: No    Durable POA for healthcare:  Yes    Advanced directive: No      Comments:  alyce is durable poa    Cognitive Screening:   Provider or family/friend/caregiver concerned regarding cognition?: No    PREVENTIVE SCREENINGS      Cardiovascular Screening:    General: Patient Declines      Diabetes Screening:     General: Patient Declines      Colorectal Cancer Screening:     General: Patient Declines      Breast Cancer Screening:     General: Patient Declines      Cervical Cancer Screening:    General: Screening Not Indicated      Osteoporosis Screening:    General: Patient Declines      Abdominal Aortic Aneurysm (AAA) Screening:        General: Patient Declines      Lung Cancer Screening:     General: Screening Not Indicated      Hepatitis C Screening:    General: Patient Declines    Screening, Brief Intervention, and Referral to Treatment (SBIRT)    Screening    Typical number of drinks in a week: 0    Single Item Drug Screening:  How often have you used an illegal drug (including marijuana) or a prescription medication for non-medical reasons in the past year? never    Single Item Drug Screen Score: 0  Interpretation: Negative screen for possible drug use disorder    Brief Intervention  Alcohol & drug use screenings were reviewed  No concerns regarding substance use disorder identified         Diony Reynoso DO

## 2021-05-05 NOTE — PATIENT INSTRUCTIONS
Medicare Preventive Visit Patient Instructions  Thank you for completing your Welcome to Medicare Visit or Medicare Annual Wellness Visit today  Your next wellness visit will be due in one year (5/6/2022)  The screening/preventive services that you may require over the next 5-10 years are detailed below  Some tests may not apply to you based off risk factors and/or age  Screening tests ordered at today's visit but not completed yet may show as past due  Also, please note that scanned in results may not display below  Preventive Screenings:  Service Recommendations Previous Testing/Comments   Colorectal Cancer Screening  * Colonoscopy    * Fecal Occult Blood Test (FOBT)/Fecal Immunochemical Test (FIT)  * Fecal DNA/Cologuard Test  * Flexible Sigmoidoscopy Age: 54-65 years old   Colonoscopy: every 10 years (may be performed more frequently if at higher risk)  OR  FOBT/FIT: every 1 year  OR  Cologuard: every 3 years  OR  Sigmoidoscopy: every 5 years  Screening may be recommended earlier than age 48 if at higher risk for colorectal cancer  Also, an individualized decision between you and your healthcare provider will decide whether screening between the ages of 74-80 would be appropriate  Colonoscopy: Not on file  FOBT/FIT: Not on file  Cologuard: Not on file  Sigmoidoscopy: Not on file          Breast Cancer Screening Age: 36 years old  Frequency: every 1-2 years  Not required if history of left and right mastectomy Mammogram: Not on file        Cervical Cancer Screening Between the ages of 21-29, pap smear recommended once every 3 years  Between the ages of 33-67, can perform pap smear with HPV co-testing every 5 years     Recommendations may differ for women with a history of total hysterectomy, cervical cancer, or abnormal pap smears in past  Pap Smear: Not on file    Screening Not Indicated   Hepatitis C Screening Once for adults born between [facility]  More frequently in patients at high risk for Hepatitis C Hep C Antibody: Not on file        Diabetes Screening 1-2 times per year if you're at risk for diabetes or have pre-diabetes Fasting glucose: No results in last 5 years   A1C: No results in last 5 years        Cholesterol Screening Once every 5 years if you don't have a lipid disorder  May order more often based on risk factors  Lipid panel: Not on file          Other Preventive Screenings Covered by Medicare:  1  Abdominal Aortic Aneurysm (AAA) Screening: covered once if your at risk  You're considered to be at risk if you have a family history of AAA  2  Lung Cancer Screening: covers low dose CT scan once per year if you meet all of the following conditions: (1) Age 50-69; (2) No signs or symptoms of lung cancer; (3) Current smoker or have quit smoking within the last 15 years; (4) You have a tobacco smoking history of at least 30 pack years (packs per day multiplied by number of years you smoked); (5) You get a written order from a healthcare provider  3  Glaucoma Screening: covered annually if you're considered high risk: (1) You have diabetes OR (2) Family history of glaucoma OR (3)  aged 48 and older OR (3)  American aged 72 and older  3  Osteoporosis Screening: covered every 2 years if you meet one of the following conditions: (1) You're estrogen deficient and at risk for osteoporosis based off medical history and other findings; (2) Have a vertebral abnormality; (3) On glucocorticoid therapy for more than 3 months; (4) Have primary hyperparathyroidism; (5) On osteoporosis medications and need to assess response to drug therapy  · Last bone density test (DXA Scan): Not on file  5  HIV Screening: covered annually if you're between the age of 12-76  Also covered annually if you are younger than 13 and older than 72 with risk factors for HIV infection  For pregnant patients, it is covered up to 3 times per pregnancy      Immunizations:  Immunization Recommendations Influenza Vaccine Annual influenza vaccination during flu season is recommended for all persons aged >= 6 months who do not have contraindications   Pneumococcal Vaccine (Prevnar and Pneumovax)  * Prevnar = PCV13  * Pneumovax = PPSV23   Adults 25-60 years old: 1-3 doses may be recommended based on certain risk factors  Adults 72 years old: Prevnar (PCV13) vaccine recommended followed by Pneumovax (PPSV23) vaccine  If already received PPSV23 since turning 65, then PCV13 recommended at least one year after PPSV23 dose  Hepatitis B Vaccine 3 dose series if at intermediate or high risk (ex: diabetes, end stage renal disease, liver disease)   Tetanus (Td) Vaccine - COST NOT COVERED BY MEDICARE PART B Following completion of primary series, a booster dose should be given every 10 years to maintain immunity against tetanus  Td may also be given as tetanus wound prophylaxis  Tdap Vaccine - COST NOT COVERED BY MEDICARE PART B Recommended at least once for all adults  For pregnant patients, recommended with each pregnancy  Shingles Vaccine (Shingrix) - COST NOT COVERED BY MEDICARE PART B  2 shot series recommended in those aged 48 and above     Health Maintenance Due:      Topic Date Due    Hepatitis C Screening  Never done     Immunizations Due:      Topic Date Due    COVID-19 Vaccine (1) Never done    DTaP,Tdap,and Td Vaccines (1 - Tdap) Never done    Pneumococcal Vaccine: 65+ Years (1 of 1 - PPSV23) Never done     Advance Directives   What are advance directives? Advance directives are legal documents that state your wishes and plans for medical care  These plans are made ahead of time in case you lose your ability to make decisions for yourself  Advance directives can apply to any medical decision, such as the treatments you want, and if you want to donate organs  What are the types of advance directives? There are many types of advance directives, and each state has rules about how to use them   You may choose a combination of any of the following:  · Living will: This is a written record of the treatment you want  You can also choose which treatments you do not want, which to limit, and which to stop at a certain time  This includes surgery, medicine, IV fluid, and tube feedings  · Durable power of  for healthcare [facility]): This is a written record that states who you want to make healthcare choices for you when you are unable to make them for yourself  This person, called a proxy, is usually a family member or a friend  You may choose more than 1 proxy  · Do not resuscitate (DNR) order:  A DNR order is used in case your heart stops beating or you stop breathing  It is a request not to have certain forms of treatment, such as CPR  A DNR order may be included in other types of advance directives  · Medical directive: This covers the care that you want if you are in a coma, near death, or unable to make decisions for yourself  You can list the treatments you want for each condition  Treatment may include pain medicine, surgery, blood transfusions, dialysis, IV or tube feedings, and a ventilator (breathing machine)  · Values history: This document has questions about your views, beliefs, and how you feel and think about life  This information can help others choose the care that you would choose  Why are advance directives important? An advance directive helps you control your care  Although spoken wishes may be used, it is better to have your wishes written down  Spoken wishes can be misunderstood, or not followed  Treatments may be given even if you do not want them  An advance directive may make it easier for your family to make difficult choices about your care  Depression   Depression  is a medical condition that causes feelings of sadness or hopelessness that do not go away  Depression may cause you to lose interest in things you used to enjoy   These feelings may interfere with your daily life   Call your local emergency number (911 in the [address] [place] [address] [address]) if:   · You think about harming yourself or someone else  · You have done something on purpose to hurt yourself  The following resources are available at any time to help you, if needed:   · [address]: 2-890.113.9076 (8-339-570-ZQVO)   · Suicide Hotline: 9-272.710.9462 (7-319-GOWIDHB)   · For a list of international numbers: https://save org/find-help/international-resources/  Treatment for depression may include medicine to relieve depression  Medicine is often used together with therapy  Therapy is a way for you to talk about your feelings and anything that may be causing depression  Therapy can be done alone or in a group  It may also be done with family members or a significant other  · Get regular physical activity  · Create a regular sleep schedule  · Eat a variety of healthy foods  · Do not drink alcohol or use drugs  Urinary Incontinence   Urinary incontinence (UI)  is when you lose control of your bladder  UI develops because your bladder cannot store or empty urine properly  The 3 most common types of UI are stress incontinence, urge incontinence, or both  Medicines:   · May be given to help strengthen your bladder control  Report any side effects of medication to your healthcare provider  Do pelvic muscle exercises often:  Your pelvic muscles help you stop urinating  Squeeze these muscles tight for 5 seconds, then relax for 5 seconds  Gradually work up to squeezing for 10 seconds  Do 3 sets of 15 repetitions a day, or as directed  This will help strengthen your pelvic muscles and improve bladder control  Train your bladder:  Go to the bathroom at set times, such as every 2 hours, even if you do not feel the urge to go  You can also try to hold your urine when you feel the urge to go  For example, hold your urine for 5 minutes when you feel the urge to go   As that becomes easier, hold your urine for 10 minutes  Self-care:   · Keep a UI record  Write down how often you leak urine and how much you leak  Make a note of what you were doing when you leaked urine  · Drink liquids as directed  You may need to limit the amount of liquid you drink to help control your urine leakage  Do not drink any liquid right before you go to bed  Limit or do not have drinks that contain caffeine or alcohol  · Prevent constipation  Eat a variety of high-fiber foods  Good examples are high-fiber cereals, beans, vegetables, and whole-grain breads  Walking is the best way to trigger your intestines to have a bowel movement  · Exercise regularly and maintain a healthy weight  Weight loss and exercise will decrease pressure on your bladder and help you control your leakage  · Use a catheter as directed  to help empty your bladder  A catheter is a tiny, plastic tube that is put into your bladder to drain your urine  · Go to behavior therapy as directed  Behavior therapy may be used to help you learn to control your urge to urinate  © Copyright [employer] 2018 Information is for End User's use only and may not be sold, redistributed or otherwise used for commercial purposes   All illustrations and images included in CareNotes® are the copyrighted property of [employer]  or [address] [place] [address] [employer]

## 2021-11-26 DIAGNOSIS — I10 ESSENTIAL HYPERTENSION: ICD-10-CM

## 2021-11-26 DIAGNOSIS — F41.9 ANXIETY: ICD-10-CM

## 2021-11-29 RX ORDER — VERAPAMIL HYDROCHLORIDE 300 MG/1
300 CAPSULE, EXTENDED RELEASE ORAL DAILY
Qty: 90 CAPSULE | Refills: 1 | Status: SHIPPED | OUTPATIENT
Start: 2021-11-29

## 2021-11-29 RX ORDER — ALPRAZOLAM 0.25 MG/1
0.25 TABLET ORAL 3 TIMES DAILY PRN
Qty: 45 TABLET | Refills: 0 | Status: SHIPPED | OUTPATIENT
Start: 2021-11-29

## 2022-07-07 ENCOUNTER — TELEPHONE (OUTPATIENT)
Dept: NEUROLOGY | Facility: CLINIC | Age: 77
End: 2022-07-07

## 2022-07-07 NOTE — TELEPHONE ENCOUNTER
LMOM for pt to call office to r/s 7/26 appt    If pt calls please r/s appt with next available epileptologist

## 2022-07-18 ENCOUNTER — OFFICE VISIT (OUTPATIENT)
Dept: NEUROLOGY | Facility: CLINIC | Age: 77
End: 2022-07-18
Payer: MEDICARE

## 2022-07-18 VITALS
DIASTOLIC BLOOD PRESSURE: 68 MMHG | HEART RATE: 78 BPM | TEMPERATURE: 96.3 F | BODY MASS INDEX: 24.14 KG/M2 | HEIGHT: 58 IN | WEIGHT: 115 LBS | SYSTOLIC BLOOD PRESSURE: 112 MMHG | OXYGEN SATURATION: 97 %

## 2022-07-18 DIAGNOSIS — R56.9 SEIZURE-LIKE ACTIVITY (HCC): Primary | ICD-10-CM

## 2022-07-18 PROCEDURE — 99205 OFFICE O/P NEW HI 60 MIN: CPT | Performed by: STUDENT IN AN ORGANIZED HEALTH CARE EDUCATION/TRAINING PROGRAM

## 2022-07-18 RX ORDER — LEVETIRACETAM 500 MG/1
500 TABLET ORAL 2 TIMES DAILY
COMMUNITY
Start: 2022-05-19

## 2022-07-18 RX ORDER — ASPIRIN 81 MG
81 TABLET,CHEWABLE ORAL DAILY
COMMUNITY
Start: 2022-05-19

## 2022-07-18 NOTE — PATIENT INSTRUCTIONS
Continue with Keppra 500 twice a day    Will complete paperwork once 6 months seizure free      Follow up 11/15

## 2022-07-18 NOTE — PROGRESS NOTES
[place] [facility] [address] [facility] [address] [place] [address]  Initial Consultation    Impression/Plan    Surjit Pedroza is a 68 y o   female with a PMH of one episode of seizure-like activity, essential tremor, and aortic stenosis presenting to the [place] [address] [facility] for evaluation of her seizure-like episode  Unclear etiology of her symptoms  Seizure cannot be ruled out  Nightmare, confusional arousal, and other sleep disorders can be on the differential  Her MRI findings did not reveal any epileptogenic lesion  EEG was normal  Typically wouldn't start someone on Keppra after a single unprovoked seizure  Her neurological exam is consistent with an essential tremor  Plan outlined below:    #Seizure  - C/w Keppra 500 BID  - OK to drive once 6 months seizure free  - Follow up in 6 months or sooner if needed  We discussed the pathophysiology of epilepsy/seizure and seizure safety/precautions including driving restrictions following seizures  We discussed factors that can lower seizure threshold and the side effects of antiepileptic medications  Diagnoses and all orders for this visit:    Seizure-like activity ([address] )    Other orders  -     Aspirin Low Dose 81 MG chewable tablet; Chew 81 mg daily Chew and swallow  -     levETIRAcetam (KEPPRA) 500 mg tablet; Take 500 mg by mouth 2 (two) times a day        Subjective    Surjit Pedroza is a 68 y o   female with a PMH of one episode of seizure-like activity, essential tremor, and aortic stenosis presenting to the [place] [address] [facility] for evaluation of her seizure-like episode  The patient presented to the ED in 2/26/2022 after a paroxysmal episode  Her  woke up while sleeping and noted some sonorus breathing along with right sided facial droop  She was able to be aroused and after 20 seconds of confusion she was back to baseline  There was some possible shaking as well but timeline of events is unclear  There was some drooling   There was a questionable tongue bite and no incontinence  EMS arrived and saw no focal deficits  She came to the ED for a stroke evaluation  Routine EEG was normal  She was discharged on Keppra 500 BID  Since then she has been seizure free  She initially felt the Keppra worsened her anxiety but that has gotten better lately  Epilepsy Risk Factors:  Stroke    Current AEDs:  Keppra 500 BID  Medication side effects: None  Medication adherence: Yes    Prior AEDs:  None    Prior Evaluation:  - routine EEG 2/28/2022: Normal awake EEG    - MRI brain 2/26/2022: No intracranial mass, recent infarct, intracranial hemorrhage, or hydrocephalus  There are chronic small vessel/lacunar infarcts in bilateral cerebellar hemispheres, in the right centrum semiovale and in the right corona radiata  3 mm subependymal nodule projecting into the right frontal horn which does not convincingly follow gray matter intensity on all pulse sequences possibly a hamartoma  No pathologic enhancement in the brain parenchyma  No other structural abnormality is seen  No mesial temporal sclerosis is evident  History Reviewed: The following were reviewed and updated as appropriate: allergies, current medications, past family history, past medical history, past social history, past surgical history and problem list     Psychiatric History:  Anxiety    Social History:   Driving: No  Lives Alone: No  Occupation: retired    ROS:  Review of Systems   Constitutional: Negative  Negative for appetite change and fever  HENT: Negative  Negative for hearing loss, tinnitus, trouble swallowing and voice change  Eyes: Negative  Negative for photophobia and pain  Respiratory: Negative  Negative for shortness of breath  Cardiovascular: Negative  Negative for palpitations  Gastrointestinal: Negative  Negative for nausea and vomiting  Endocrine: Negative  Negative for cold intolerance  Genitourinary: Negative    Negative for dysuria, frequency and urgency  Musculoskeletal: Negative  Negative for myalgias and neck pain  Skin: Negative  Negative for rash  Neurological: Negative  Negative for dizziness, tremors, seizures, syncope, facial asymmetry, speech difficulty, weakness, light-headedness, numbness and headaches  Hematological: Negative  Does not bruise/bleed easily  Psychiatric/Behavioral: Negative  Negative for confusion, hallucinations and sleep disturbance  Objective    /68 (BP Location: Left arm, Patient Position: Sitting, Cuff Size: Standard)   Pulse 78   Temp (!) 96 3 °F (35 7 °C) (Temporal)   Ht 4' 10" (1 473 m)   Wt 52 2 kg (115 lb)   LMP  (LMP Unknown)   SpO2 97%   BMI 24 04 kg/m²      General Exam  General: well developed, no acute distress  HEENT: mucous membranes moist, anicteric sclera  Neck: supple, good ROM  Extremities: no clubbing, cyanosis or edema  Skin: no rash on visible skin  Neurological Exam  Mental Status: awake, alert, and fully oriented to person, place, time, and situation  Attention and memory intact  Fund of knowledge is appropriate for age and education  There is no neglect  Language: fluency, and comprehension normal        Cranial Nerves: Pupils equal and reactive to light  Visual fields full to confrontation  Extraocular motions intact with full versions, normal pursuits and saccades  Facial strength full and symmetric  Facial sensation intact in V1-V3  Hearing intact to voice  Tongue protrudes to midline  Palate elevates symmetrically  Speech clear without notable dysarthria  Shoulder shrug activation full and symmetric  Motor: Normal bulk and tone  No pronator drift  Strength is 5/5 proximally and distally in all 4 extremities  Mild action tremor in bilateral upper extremities  Sensory: Sensation intact to light touch distally in all extremities  Coordination: Normal finger-to-nose  Normal rapid alternating movements       Station and gait: Casual gait normal      Reflexes: Reflexes 2+ throughout and symmetric       Ijeoma Bah MD   [address] [facility]  [facility] [address] [facility]

## 2022-08-30 ENCOUNTER — TELEPHONE (OUTPATIENT)
Dept: NEUROLOGY | Facility: CLINIC | Age: 77
End: 2022-08-30

## 2022-08-30 NOTE — TELEPHONE ENCOUNTER
Patient brought in seizure reporting form to be filled out  Scanned paperwork into the chart  Dropped and collected the charge of $5  Will hand and route to [facility]  Patient wants to be called at [phone] once completed

## 2022-11-15 ENCOUNTER — OFFICE VISIT (OUTPATIENT)
Dept: NEUROLOGY | Facility: CLINIC | Age: 77
End: 2022-11-15

## 2022-11-15 VITALS
DIASTOLIC BLOOD PRESSURE: 80 MMHG | WEIGHT: 115.1 LBS | BODY MASS INDEX: 24.06 KG/M2 | HEART RATE: 77 BPM | SYSTOLIC BLOOD PRESSURE: 110 MMHG

## 2022-11-15 DIAGNOSIS — G25.0 TREMOR, ESSENTIAL: ICD-10-CM

## 2022-11-15 DIAGNOSIS — R56.9 SEIZURE-LIKE ACTIVITY (HCC): Primary | ICD-10-CM

## 2022-11-15 NOTE — PATIENT INSTRUCTIONS
Decrease the Keppra to 1 pill (500 mg) at night for a week then stop  Will monitor your essential tremor to see if you ever need treatment for it  Follow up in 6 months or sooner if needed

## 2022-11-15 NOTE — PROGRESS NOTES
[place] [address] [facility] -   Follow up appointment    Impression/Plan    Ambrosio Burnett is a 68 y o  female with a PMH of one episode of seizure-like activity, essential tremor, and aortic stenosis presenting to the [place] [address] [facility] for evaluation of her seizure-like episode  Her neurological exam is consistent with a mild essential tremor for which she isn't interested in starting medication for  She has been 6+ months seizure free  In the setting of a single unprovoked seizure with a normal EEG and MRI, the risk for a second unprovoked seizure is low enough that it is reasonable to discontinue the Keppra as the patient wishes  Plan outlined below:    #Single unprovoked seizure  - Decrease Keppra to 500 mg qhs for one week and then discontinue    #Essential tremor  - Continue to monitor  Defer treatment as symptoms are mild    - Follow up in 6 months or sooner if needed  Diagnoses and all orders for this visit:    Seizure-like activity ([address] )    Tremor, essential        Subjective    Ambrosio Burnett is a 68 y o  female with a PMH of one episode of seizure-like activity, essential tremor, and aortic stenosis presenting to the [address] [facility] for evaluation of her seizure-like episode  For review:     The patient presented to the ED in 2/26/2022 after a paroxysmal episode  Her  woke up while sleeping and noted some sonorus breathing along with right sided facial droop  She was able to be aroused and after 20 seconds of confusion she was back to baseline  There was some possible shaking as well but timeline of events is unclear  There was some drooling  There was a questionable tongue bite and no incontinence       EMS arrived and saw no focal deficits  She came to the ED for a stroke evaluation  Routine EEG was normal  She was discharged on Keppra 500 BID       She was evaluated in the office on 7/18/2022  She reported being episode free since the office visit  She initially felt the [address] worsened her anxiety but that has gotten better  Plan at that time was to continue Keppra 500 BID for now but consider weaning it in the future  Interval history:    Since last seen, the patient has been doing well  There have been no seizures since the last appointment  There is no concern for medication non-adherence  She is interested in weaning off the [address] and she thinks it makes her cranky  Epilepsy Risk Factors:  Stroke     Current AEDs:  Keppra 500 BID  Medication side effects: None  Medication adherence: Yes     Prior AEDs:  None     Prior Evaluation:  - routine EEG 2/28/2022: Normal awake EEG     - MRI brain 2/26/2022: No intracranial mass, recent infarct, intracranial hemorrhage, or hydrocephalus  There are chronic small vessel/lacunar infarcts in bilateral cerebellar hemispheres, in the right centrum semiovale and in the right corona radiata  3 mm subependymal nodule projecting into the right frontal horn which does not convincingly follow gray matter intensity on all pulse sequences possibly a hamartoma  No pathologic enhancement in the brain parenchyma  No other structural abnormality is seen  No mesial temporal sclerosis is evident  History Reviewed: The following were reviewed and updated as appropriate: allergies, current medications, past family history, past medical history, past social history, past surgical history and problem list    ROS:  Review of Systems   Constitutional: Negative  Negative for appetite change and fever  HENT: Negative  Negative for hearing loss, tinnitus, trouble swallowing and voice change  Eyes: Negative  Negative for photophobia, pain and visual disturbance  Respiratory: Negative  Negative for shortness of breath  Cardiovascular: Negative  Negative for palpitations  Gastrointestinal: Negative  Negative for nausea and vomiting  Endocrine: Negative  Negative for cold intolerance  Genitourinary: Negative  Negative for dysuria, frequency and urgency  Musculoskeletal: Negative  Negative for gait problem, myalgias and neck pain  Skin: Negative  Negative for rash  Allergic/Immunologic: Negative  Neurological: Negative  Negative for dizziness, tremors, seizures, syncope, facial asymmetry, speech difficulty, weakness, light-headedness, numbness and headaches  Hematological: Negative  Does not bruise/bleed easily  Psychiatric/Behavioral: Negative  Negative for confusion, hallucinations and sleep disturbance  All other systems reviewed and are negative  Objective    /80   Pulse 77   Wt 52 2 kg (115 lb 1 6 oz)   LMP  (LMP Unknown)   BMI 24 06 kg/m²      General Exam  General: well developed, no acute distress  HEENT: mucous membranes moist, anicteric sclera  Neck: supple, good ROM  Extremities: no clubbing, cyanosis or edema  Skin: no rash on visible skin  Neurological Exam  Mental Status: awake, alert, and fully oriented to person, place, time, and situation  Attention and memory intact  Fund of knowledge is appropriate for age and education  There is no neglect  Language: fluency, and comprehension normal        Cranial Nerves: Pupils equal and reactive to light  Visual fields full to confrontation  Extraocular motions intact with full versions, normal pursuits and saccades  Facial strength full and symmetric  Hearing intact to voice  Tongue protrudes to midline  Palate elevates symmetrically  Speech clear without notable dysarthria  Motor: Normal bulk and tone  No pronator drift  Strength is 5/5 proximally and distally in all 4 extremities  Mild fine resting tremor of the head, less so in the bilateral arms  Sensory: Sensation intact to light touch distally in all extremities  Coordination: Normal finger-to-nose  Normal rapid alternating movements  Station and gait: Casual gait normal      Reflexes: Reflexes 2+ throughout and symmetric        Bon Abdalla MD [facility]  [facility] [address] [facility]